# Patient Record
Sex: MALE | Race: WHITE | NOT HISPANIC OR LATINO | URBAN - METROPOLITAN AREA
[De-identification: names, ages, dates, MRNs, and addresses within clinical notes are randomized per-mention and may not be internally consistent; named-entity substitution may affect disease eponyms.]

---

## 2021-11-17 ENCOUNTER — OFFICE VISIT (OUTPATIENT)
Dept: URBAN - METROPOLITAN AREA CLINIC 44 | Facility: CLINIC | Age: 81
End: 2021-11-17
Payer: MEDICARE

## 2021-11-17 ENCOUNTER — DASHBOARD ENCOUNTERS (OUTPATIENT)
Age: 81
End: 2021-11-17

## 2021-11-17 VITALS
HEIGHT: 70 IN | WEIGHT: 201.2 LBS | DIASTOLIC BLOOD PRESSURE: 76 MMHG | HEART RATE: 55 BPM | SYSTOLIC BLOOD PRESSURE: 130 MMHG | BODY MASS INDEX: 28.8 KG/M2 | TEMPERATURE: 98 F

## 2021-11-17 DIAGNOSIS — R14.0 ABDOMINAL BLOATING: ICD-10-CM

## 2021-11-17 DIAGNOSIS — E85.9 AMYLOIDOSIS, UNSPECIFIED: ICD-10-CM

## 2021-11-17 DIAGNOSIS — R19.4 CHANGE IN BOWEL HABITS: ICD-10-CM

## 2021-11-17 PROBLEM — 88111009 CHANGE IN BOWEL HABIT: Status: ACTIVE | Noted: 2021-11-17

## 2021-11-17 PROBLEM — 17602002 AMYLOIDOSIS: Status: ACTIVE | Noted: 2021-11-17

## 2021-11-17 PROCEDURE — 99204 OFFICE O/P NEW MOD 45 MIN: CPT | Performed by: INTERNAL MEDICINE

## 2021-11-17 RX ORDER — GLIMEPIRIDE 4 MG/1
1 TABLET WITH BREAKFAST OR THE FIRST MAIN MEAL OF THE DAY TABLET ORAL TWICE A DAY
Status: ACTIVE | COMMUNITY

## 2021-11-17 RX ORDER — PRAVASTATIN SODIUM 10 MG/1
1 TABLET TABLET ORAL ONCE A DAY
Status: ACTIVE | COMMUNITY

## 2021-11-17 RX ORDER — METOPROLOL SUCCINATE 50 MG/1
1 TABLET TABLET, EXTENDED RELEASE ORAL ONCE A DAY
Status: ACTIVE | COMMUNITY

## 2021-11-17 RX ORDER — TORSEMIDE 20 MG/1
1 TABLET TABLET ORAL ONCE A DAY
Status: ACTIVE | COMMUNITY

## 2021-11-17 RX ORDER — METFORMIN HYDROCHLORIDE 500 MG/1
1 TABLET WITH EVENING MEAL TABLET, EXTENDED RELEASE ORAL
Status: ACTIVE | COMMUNITY

## 2021-11-17 RX ORDER — VITAMIN A PALMITATE 3000 MCG
1 CAPSULE CAPSULE ORAL ONCE A DAY
Status: ACTIVE | COMMUNITY

## 2021-11-17 RX ORDER — APIXABAN 5 MG/1
1 TABLET BY MOUTH TABLET, FILM COATED ORAL
Status: ACTIVE | COMMUNITY

## 2021-11-17 RX ORDER — DOFETILIDE 0.25 MG/1
1 CAPSULE CAPSULE ORAL TWICE A DAY
Status: ACTIVE | COMMUNITY

## 2021-11-17 NOTE — HPI-TODAY'S VISIT:
81-year-old male is here complaining of abdominal bloating and chronic diarrhea.  He reports she has been diagnosed with cardiac amyloidosis 2 years ago the patient is currently followed at the [facility].  To decrease his symptoms he is decrease his Metformin and has cut way back on his meal volumes.  Currently his weight is stable denies any rectal bleeding.  Reports the bloating diarrhea have gotten worse.  Patient wants to know what he can do over-the-counter to lessen his symptoms and we discussed probiotics and Imodium.  The patient does not want to schedule an EGD and colonoscopy to make the diagnosis of amyloidosis involving the GI tract at this point.